# Patient Record
Sex: FEMALE | Race: WHITE | NOT HISPANIC OR LATINO | Employment: FULL TIME | URBAN - METROPOLITAN AREA
[De-identification: names, ages, dates, MRNs, and addresses within clinical notes are randomized per-mention and may not be internally consistent; named-entity substitution may affect disease eponyms.]

---

## 2019-08-26 ENCOUNTER — TELEPHONE (OUTPATIENT)
Dept: FAMILY MEDICINE | Facility: CLINIC | Age: 39
End: 2019-08-26

## 2019-08-26 ENCOUNTER — OFFICE VISIT (OUTPATIENT)
Dept: FAMILY MEDICINE | Facility: CLINIC | Age: 39
End: 2019-08-26
Payer: COMMERCIAL

## 2019-08-26 VITALS
TEMPERATURE: 98.4 F | DIASTOLIC BLOOD PRESSURE: 67 MMHG | HEIGHT: 67 IN | BODY MASS INDEX: 17.37 KG/M2 | SYSTOLIC BLOOD PRESSURE: 106 MMHG | OXYGEN SATURATION: 98 % | WEIGHT: 110.7 LBS | RESPIRATION RATE: 18 BRPM | HEART RATE: 80 BPM

## 2019-08-26 DIAGNOSIS — R61 NIGHT SWEATS: ICD-10-CM

## 2019-08-26 DIAGNOSIS — R35.0 URINARY FREQUENCY: ICD-10-CM

## 2019-08-26 DIAGNOSIS — R25.1 SHAKY: ICD-10-CM

## 2019-08-26 DIAGNOSIS — M54.50 CHRONIC BILATERAL LOW BACK PAIN WITHOUT SCIATICA: ICD-10-CM

## 2019-08-26 DIAGNOSIS — R82.71 BACTERIA IN URINE: ICD-10-CM

## 2019-08-26 DIAGNOSIS — N83.209 CYST OF OVARY, UNSPECIFIED LATERALITY: ICD-10-CM

## 2019-08-26 DIAGNOSIS — B96.89 BACTERIAL VAGINOSIS: ICD-10-CM

## 2019-08-26 DIAGNOSIS — R11.0 NAUSEA: ICD-10-CM

## 2019-08-26 DIAGNOSIS — M54.2 NECK PAIN ON LEFT SIDE: Primary | ICD-10-CM

## 2019-08-26 DIAGNOSIS — N89.8 VAGINAL DISCHARGE: ICD-10-CM

## 2019-08-26 DIAGNOSIS — R53.83 OTHER FATIGUE: ICD-10-CM

## 2019-08-26 DIAGNOSIS — N34.1 NONGONOCOCCAL URETHRITIS DUE TO UREAPLASMA UREALYTICUM: ICD-10-CM

## 2019-08-26 DIAGNOSIS — Z71.6 ENCOUNTER FOR TOBACCO USE CESSATION COUNSELING: ICD-10-CM

## 2019-08-26 DIAGNOSIS — B37.0 ORAL THRUSH: ICD-10-CM

## 2019-08-26 DIAGNOSIS — N76.0 BACTERIAL VAGINOSIS: ICD-10-CM

## 2019-08-26 DIAGNOSIS — G89.29 CHRONIC BILATERAL LOW BACK PAIN WITHOUT SCIATICA: ICD-10-CM

## 2019-08-26 DIAGNOSIS — A49.3 NONGONOCOCCAL URETHRITIS DUE TO UREAPLASMA UREALYTICUM: ICD-10-CM

## 2019-08-26 LAB
ALBUMIN SERPL-MCNC: 4.4 G/DL (ref 3.4–5)
ALBUMIN UR-MCNC: NEGATIVE MG/DL
ALP SERPL-CCNC: 41 U/L (ref 40–150)
ALT SERPL W P-5'-P-CCNC: 22 U/L (ref 0–50)
ANION GAP SERPL CALCULATED.3IONS-SCNC: 8 MMOL/L (ref 3–14)
APPEARANCE UR: CLEAR
AST SERPL W P-5'-P-CCNC: 12 U/L (ref 0–45)
BACTERIA #/AREA URNS HPF: ABNORMAL /HPF
BILIRUB SERPL-MCNC: 0.5 MG/DL (ref 0.2–1.3)
BILIRUB UR QL STRIP: NEGATIVE
BUN SERPL-MCNC: 12 MG/DL (ref 7–30)
CALCIUM SERPL-MCNC: 8.9 MG/DL (ref 8.5–10.1)
CHLORIDE SERPL-SCNC: 106 MMOL/L (ref 94–109)
CO2 SERPL-SCNC: 27 MMOL/L (ref 20–32)
COLOR UR AUTO: YELLOW
CORTIS SERPL-MCNC: 6.8 UG/DL (ref 4–22)
CREAT SERPL-MCNC: 0.65 MG/DL (ref 0.52–1.04)
CRP SERPL-MCNC: <2.9 MG/L (ref 0–8)
ERYTHROCYTE [SEDIMENTATION RATE] IN BLOOD BY WESTERGREN METHOD: 5 MM/H (ref 0–20)
GFR SERPL CREATININE-BSD FRML MDRD: >90 ML/MIN/{1.73_M2}
GLUCOSE SERPL-MCNC: 92 MG/DL (ref 70–99)
GLUCOSE UR STRIP-MCNC: NEGATIVE MG/DL
HBA1C MFR BLD: 4.8 % (ref 0–5.6)
HGB UR QL STRIP: ABNORMAL
KETONES UR STRIP-MCNC: ABNORMAL MG/DL
LEUKOCYTE ESTERASE UR QL STRIP: NEGATIVE
MUCOUS THREADS #/AREA URNS LPF: PRESENT /LPF
NITRATE UR QL: NEGATIVE
NON-SQ EPI CELLS #/AREA URNS LPF: ABNORMAL /LPF
PH UR STRIP: 6 PH (ref 5–7)
POTASSIUM SERPL-SCNC: 3.7 MMOL/L (ref 3.4–5.3)
PROT SERPL-MCNC: 7.6 G/DL (ref 6.8–8.8)
RBC #/AREA URNS AUTO: ABNORMAL /HPF
SODIUM SERPL-SCNC: 141 MMOL/L (ref 133–144)
SOURCE: ABNORMAL
SP GR UR STRIP: 1.02 (ref 1–1.03)
TSH SERPL DL<=0.005 MIU/L-ACNC: 2 MU/L (ref 0.4–4)
UROBILINOGEN UR STRIP-ACNC: 0.2 EU/DL (ref 0.2–1)
WBC #/AREA URNS AUTO: ABNORMAL /HPF

## 2019-08-26 PROCEDURE — 86618 LYME DISEASE ANTIBODY: CPT | Performed by: NURSE PRACTITIONER

## 2019-08-26 PROCEDURE — 86376 MICROSOMAL ANTIBODY EACH: CPT | Performed by: NURSE PRACTITIONER

## 2019-08-26 PROCEDURE — 87798 DETECT AGENT NOS DNA AMP: CPT | Mod: 90 | Performed by: NURSE PRACTITIONER

## 2019-08-26 PROCEDURE — 85652 RBC SED RATE AUTOMATED: CPT | Performed by: NURSE PRACTITIONER

## 2019-08-26 PROCEDURE — 84443 ASSAY THYROID STIM HORMONE: CPT | Performed by: NURSE PRACTITIONER

## 2019-08-26 PROCEDURE — 87086 URINE CULTURE/COLONY COUNT: CPT | Performed by: NURSE PRACTITIONER

## 2019-08-26 PROCEDURE — 87591 N.GONORRHOEAE DNA AMP PROB: CPT | Performed by: NURSE PRACTITIONER

## 2019-08-26 PROCEDURE — 80053 COMPREHEN METABOLIC PANEL: CPT | Performed by: NURSE PRACTITIONER

## 2019-08-26 PROCEDURE — 99204 OFFICE O/P NEW MOD 45 MIN: CPT | Performed by: NURSE PRACTITIONER

## 2019-08-26 PROCEDURE — 86039 ANTINUCLEAR ANTIBODIES (ANA): CPT | Performed by: NURSE PRACTITIONER

## 2019-08-26 PROCEDURE — 87491 CHLMYD TRACH DNA AMP PROBE: CPT | Performed by: NURSE PRACTITIONER

## 2019-08-26 PROCEDURE — 81001 URINALYSIS AUTO W/SCOPE: CPT | Performed by: NURSE PRACTITIONER

## 2019-08-26 PROCEDURE — 99000 SPECIMEN HANDLING OFFICE-LAB: CPT | Performed by: NURSE PRACTITIONER

## 2019-08-26 PROCEDURE — 87109 MYCOPLASMA: CPT | Performed by: NURSE PRACTITIONER

## 2019-08-26 PROCEDURE — 87210 SMEAR WET MOUNT SALINE/INK: CPT | Performed by: NURSE PRACTITIONER

## 2019-08-26 PROCEDURE — 86140 C-REACTIVE PROTEIN: CPT | Performed by: NURSE PRACTITIONER

## 2019-08-26 PROCEDURE — 85027 COMPLETE CBC AUTOMATED: CPT | Performed by: NURSE PRACTITIONER

## 2019-08-26 PROCEDURE — 86038 ANTINUCLEAR ANTIBODIES: CPT | Performed by: NURSE PRACTITIONER

## 2019-08-26 PROCEDURE — 83036 HEMOGLOBIN GLYCOSYLATED A1C: CPT | Performed by: NURSE PRACTITIONER

## 2019-08-26 PROCEDURE — 82533 TOTAL CORTISOL: CPT | Performed by: NURSE PRACTITIONER

## 2019-08-26 PROCEDURE — 36415 COLL VENOUS BLD VENIPUNCTURE: CPT | Performed by: NURSE PRACTITIONER

## 2019-08-26 RX ORDER — METRONIDAZOLE 500 MG/1
500 TABLET ORAL 2 TIMES DAILY
Qty: 14 TABLET | Refills: 0 | Status: SHIPPED | OUTPATIENT
Start: 2019-08-26 | End: 2019-09-10

## 2019-08-26 RX ORDER — FLUCONAZOLE 100 MG/1
100 TABLET ORAL DAILY
Qty: 15 TABLET | Refills: 0 | Status: SHIPPED | OUTPATIENT
Start: 2019-08-26 | End: 2019-09-10

## 2019-08-26 RX ORDER — NICOTINE 21 MG/24HR
1 PATCH, TRANSDERMAL 24 HOURS TRANSDERMAL EVERY 24 HOURS
Qty: 30 PATCH | Refills: 0 | Status: SHIPPED | OUTPATIENT
Start: 2019-08-26 | End: 2019-09-10

## 2019-08-26 RX ORDER — NYSTATIN 100000/ML
500000 SUSPENSION, ORAL (FINAL DOSE FORM) ORAL
COMMUNITY
Start: 2019-07-08 | End: 2019-09-10

## 2019-08-26 ASSESSMENT — PAIN SCALES - GENERAL: PAINLEVEL: MODERATE PAIN (5)

## 2019-08-26 ASSESSMENT — MIFFLIN-ST. JEOR: SCORE: 1206.82

## 2019-08-26 NOTE — PROGRESS NOTES
Subjective     Patience Gay is a 38 year old female who presents to clinic today for the following health issues:    HPI   ED/UC Followup:    Facility:  [facility]  Date of visit: 2-3 weeks ago  Reason for visit: Thrush  Current Status: Still having symptoms and always tired     Having some Vaginal Irritation-Possible STD check    Has multiple concerns: all have been on-going for months, other than oral thrush.  Left side of her neck is sore, tender. Throbs. On-going for months. Has not had US done yet. Is able to swallow, sometimes feels like there is a 'lump' in there. No rash.     Gets shaky, nauseated all the time. Also getting swollen feet. feel and bottom of legs go numb.   Always tired  Left lower back feels sore but denies injury  Has seen urology in the past, hasn't gotten kidney function lately  Sweating a lot at night  Vaginal concerns: unsure if yeast, not painful, but always frequency.     Denies tick bite.     Oral concerns: symptoms improved with fluconazole but returned. She has continued to use the oral nystatin to keep it at bay.    Had elevated MARLY-was given referral for Rheumatology but has not seen them yet.    She has been googling things, she wonders if she has diabetes.   Doesn't know mothers side for PMH.  Hx of anxiety/depression in family. Also for her. Is not currently on anything. Has done therapy before, is not right now. Does not seem interested in starting anything for anxiety.     Has had D+C, ablation, tubes cauterized,     Smokes a pack every few days.   Alcohol: more social.   Denies cocaine/marijuana.   Tried CBD oil, didn't help her  Also trouble sleeping at night. Has not tried melatonin.     Was told to get US for cyst on ovaries and also had inconclusive for pap so needs that repeated.           There is no problem list on file for this patient.    History reviewed. No pertinent surgical history.    Social History     Tobacco Use     Smoking status: Current  "Some Day Smoker     Smokeless tobacco: Never Used   Substance Use Topics     Alcohol use: Yes     Family History   Problem Relation Age of Onset     Colon Cancer Father      Thyroid Disease Paternal Aunt      Thyroid Disease Paternal Aunt      Breast Cancer Cousin         mothers side     Colon Cancer Paternal Grandfather      Diabetes No family hx of          Current Outpatient Medications   Medication Sig Dispense Refill     fluconazole (DIFLUCAN) 100 MG tablet Take 1 tablet (100 mg) by mouth daily for 15 days 15 tablet 0     metroNIDAZOLE (FLAGYL) 500 MG tablet Take 1 tablet (500 mg) by mouth 2 times daily for 7 days 14 tablet 0     nicotine (NICODERM CQ) 14 MG/24HR 24 hr patch Place 1 patch onto the skin every 24 hours 30 patch 0     nicotine (NICORETTE) 2 MG gum Place 1 each (2 mg) inside cheek 4 times daily as needed for smoking cessation 40 each 1     nystatin (MYCOSTATIN) 384249 UNIT/ML suspension 500,000 Units       Allergies   Allergen Reactions     No Clinical Screening - See Comments Itching     With anesthesia       Reviewed and updated as needed this visit by Provider  Tobacco  Allergies  Meds  Problems  Med Hx  Surg Hx  Fam Hx         Review of Systems   ROS COMP: Constitutional, HEENT, cardiovascular, pulmonary, gi and gu, MS as above, psych as above, systems are negative, except as otherwise noted.      Objective    /67 (BP Location: Right arm, Patient Position: Sitting, Cuff Size: Adult Regular)   Pulse 80   Temp 98.4  F (36.9  C) (Oral)   Resp 18   Ht 1.689 m (5' 6.5\")   Wt 50.2 kg (110 lb 11.2 oz)   SpO2 98%   BMI 17.60 kg/m    Body mass index is 17.6 kg/m .  Physical Exam   GENERAL: healthy, alert and no acute distress  EYES: Eyes grossly normal to inspection, PERRL and conjunctivae and sclerae normal  HENT: ear canals and TM's normal, nose and mouth without ulcers or lesions  NECK: no adenopathy, no asymmetry, masses, or scars and thyroid normal to palpation, not much " discomfort with palpation to neck  RESP: lungs clear to auscultation - no rales, rhonchi or wheezes  CV: regular rate and rhythm, normal S1 S2, no S3 or S4, no murmur, click or rub, no peripheral edema  ABDOMEN: soft, nontender, no hepatosplenomegaly, no masses and bowel sounds normal  MS: no gross musculoskeletal defects noted, no low back pain with palpation  SKIN: no suspicious lesions or rashes  NEURO: Normal strength and tone, mentation intact and speech normal  PSYCH: mentation appears normal, affect normal to tearful. Poor eye contact    Diagnostic Test Results:  Labs reviewed in Epic  Results for orders placed or performed in visit on 08/26/19 (from the past 24 hour(s))   Comprehensive metabolic panel (BMP + Alb, Alk Phos, ALT, AST, Total. Bili, TP)   Result Value Ref Range    Sodium 141 133 - 144 mmol/L    Potassium 3.7 3.4 - 5.3 mmol/L    Chloride 106 94 - 109 mmol/L    Carbon Dioxide 27 20 - 32 mmol/L    Anion Gap 8 3 - 14 mmol/L    Glucose 92 70 - 99 mg/dL    Urea Nitrogen 12 7 - 30 mg/dL    Creatinine 0.65 0.52 - 1.04 mg/dL    GFR Estimate >90 >60 mL/min/[1.73_m2]    GFR Estimate If Black >90 >60 mL/min/[1.73_m2]    Calcium 8.9 8.5 - 10.1 mg/dL    Bilirubin Total 0.5 0.2 - 1.3 mg/dL    Albumin 4.4 3.4 - 5.0 g/dL    Protein Total 7.6 6.8 - 8.8 g/dL    Alkaline Phosphatase 41 40 - 150 U/L    ALT 22 0 - 50 U/L    AST 12 0 - 45 U/L   Lyme Disease Joanne with reflex to WB Serum   Result Value Ref Range    Lyme Disease Antibodies Serum 0.20 0.00 - 0.89   Anti Nuclear Joanne IgG by IFA with Reflex   Result Value Ref Range    MARLY interpretation Borderline Positive (A) NEG^Negative    MARLY pattern 1 DENSE FINE SPECKLED     MARLY titer 1 1:80    Thyroid peroxidase antibody   Result Value Ref Range    Thyroid Peroxidase Antibody <10 <35 IU/mL   TSH with free T4 reflex   Result Value Ref Range    TSH 2.00 0.40 - 4.00 mU/L   Hemoglobin A1c   Result Value Ref Range    Hemoglobin A1C 4.8 0 - 5.6 %   ESR: Erythrocyte  sedimentation rate   Result Value Ref Range    Sed Rate 5 0 - 20 mm/h   CRP, inflammation   Result Value Ref Range    CRP Inflammation <2.9 0.0 - 8.0 mg/L   Cortisol   Result Value Ref Range    Cortisol Serum 6.8 4 - 22 ug/dL   *UA reflex to Microscopic and Culture ([facility] (except [place] and [place])   Result Value Ref Range    Color Urine Yellow     Appearance Urine Clear     Glucose Urine Negative NEG^Negative mg/dL    Bilirubin Urine Negative NEG^Negative    Ketones Urine Trace (A) NEG^Negative mg/dL    Specific Gravity Urine 1.025 1.003 - 1.035    Blood Urine Trace (A) NEG^Negative    pH Urine 6.0 5.0 - 7.0 pH    Protein Albumin Urine Negative NEG^Negative mg/dL    Urobilinogen Urine 0.2 0.2 - 1.0 EU/dL    Nitrite Urine Negative NEG^Negative    Leukocyte Esterase Urine Negative NEG^Negative    Source Urine    Chlamydia trachomatis PCR   Result Value Ref Range    Specimen Description Urine     Chlamydia Trachomatis PCR Negative NEG^Negative   Neisseria gonorrhoeae PCR   Result Value Ref Range    Specimen Descrip Urine     N Gonorrhea PCR Negative NEG^Negative   Urine Microscopic   Result Value Ref Range    WBC Urine 0 - 5 OTO5^0 - 5 /HPF    RBC Urine O - 2 OTO2^O - 2 /HPF    Squamous Epithelial /LPF Urine Moderate (A) FEW^Few /LPF    Bacteria Urine Moderate (A) NEG^Negative /HPF    Mucous Urine Present (A) NEG^Negative /LPF   Mycoplasma large colony culture   Result Value Ref Range    Specimen Description Unspecified Urine     Culture Micro No Trichomonas seen     Culture Micro Clue cells seen  Moderate   (A)     Culture Micro No yeast seen     Culture Micro WBC'S seen  Moderate              Assessment & Plan     1. Neck pain on left side  Can swallow okay, check US  - US Head Neck Soft Tissue; Future    2. Nausea/3. Other fatigue/4. Shaky/6. Night sweats  Has multiple concerns. Is okay with large lab workup. May need to follow up with Rheumatology, was given referral in the past, had not  "followed up. MARLY borderline positive.    - CBC with platelets  - Comprehensive metabolic panel (BMP + Alb, Alk Phos, ALT, AST, Total. Bili, TP)  - Anti Nuclear Joanne IgG by IFA with Reflex  - Thyroid peroxidase antibody  - TSH with free T4 reflex  - Cortisol  - ESR: Erythrocyte sedimentation rate  - CRP, inflammation  - Ehrlichia Anaplasma Sp by PCR  - Lyme Disease Joanne with reflex to WB Serum  - Hemoglobin A1c      5. Chronic bilateral low back pain without sciatica  Denies trauma. Does not exercise, states its feels \"deep in there\" and wonders if related to her kidneys. Renal function is normal. Likely more muscle related. Could consider low back x-ray next time if not improved.       7. Urinary frequency  Screening for infection  - *UA reflex to Microscopic and Culture ([facility] (except [place] and [place])  - Mycoplasma large colony culture    8. Cyst of ovary, unspecified laterality  Was told in the past few months she has cyst on her ovary, needs pelvic US, will order  - US Pelvic Complete w Transvaginal; Future    9. Vaginal discharge  screening  - Chlamydia trachomatis PCR  - Neisseria gonorrhoeae PCR  - Wet prep  - Urine Microscopic    10. Oral thrush  Feels this never resolved from last appointment. Will trial longer diflucan  - nystatin (MYCOSTATIN) 632901 UNIT/ML suspension; 500,000 Units  - fluconazole (DIFLUCAN) 100 MG tablet; Take 1 tablet (100 mg) by mouth daily for 15 days  Dispense: 15 tablet; Refill: 0    11. Encounter for tobacco use cessation counseling  Wondering about what can help her quit smoking. Trial patch and use gum prn  - nicotine (NICODERM CQ) 14 MG/24HR 24 hr patch; Place 1 patch onto the skin every 24 hours  Dispense: 30 patch; Refill: 0  - nicotine (NICORETTE) 2 MG gum; Place 1 each (2 mg) inside cheek 4 times daily as needed for smoking cessation  Dispense: 40 each; Refill: 1    12. Bacteria in urine  screening  - Urine Culture Aerobic Bacterial    13. Bacterial " vaginosis  Patient sent a message back stated she didn't want to do treatment unless she had to. She may trial Boric acid suppositories. See phone encounter.   - metroNIDAZOLE (FLAGYL) 500 MG tablet; Take 1 tablet (500 mg) by mouth 2 times daily for 7 days  Dispense: 14 tablet; Refill: 0       Tobacco Cessation:   reports that she has been smoking.  She has never used smokeless tobacco.  Tobacco Cessation Action Plan: Pharmacotherapies : Nicotine patch        Return in about 2 weeks (around 9/9/2019) for Routine Visit. and to further Discussed in clinic visit other concerns.    RUDY Adams, NP-C  [facility]

## 2019-08-26 NOTE — LETTER
September 3, 2019      Patience Gay  [id number] [place] [address]        Dear Patience,           All of your lab results were normal other than the MARLY which was borderline positive. Would recommend follow up with Rheumatology for the elevated MARLY, if you would like a referral please let me know.  From the urine/wet prep tests you were positive for Bacterial Vaginosis, treatment was sent to your pharmacy. I also advised staff to call you about the positive urine infection called Ureaplasma: take doxycycline 1 pill twice a day for 7 days. This was also sent to your pharmacy last week. I do not see that you have done the ultrasound of your neck yet, please get that done when you have time.     Please contact the clinic if you have additional questions.  Thank you.     Sincerely,     RUDY Adams, NP-C   [facility]     Results for orders placed or performed in visit on 08/26/19   CBC with platelets   Result Value Ref Range    WBC 8.2 4.0 - 11.0 10e9/L    RBC Count 4.68 3.8 - 5.2 10e12/L    Hemoglobin 14.9 11.7 - 15.7 g/dL    Hematocrit 44.7 35.0 - 47.0 %    MCV 96 78 - 100 fl    MCH 31.8 26.5 - 33.0 pg    MCHC 33.3 31.5 - 36.5 g/dL    RDW 12.1 10.0 - 15.0 %    Platelet Count 250 150 - 450 10e9/L   Comprehensive metabolic panel (BMP + Alb, Alk Phos, ALT, AST, Total. Bili, TP)   Result Value Ref Range    Sodium 141 133 - 144 mmol/L    Potassium 3.7 3.4 - 5.3 mmol/L    Chloride 106 94 - 109 mmol/L    Carbon Dioxide 27 20 - 32 mmol/L    Anion Gap 8 3 - 14 mmol/L    Glucose 92 70 - 99 mg/dL    Urea Nitrogen 12 7 - 30 mg/dL    Creatinine 0.65 0.52 - 1.04 mg/dL    GFR Estimate >90 >60 mL/min/[1.73_m2]    GFR Estimate If Black >90 >60 mL/min/[1.73_m2]    Calcium 8.9 8.5 - 10.1 mg/dL    Bilirubin Total 0.5 0.2 - 1.3 mg/dL    Albumin 4.4 3.4 - 5.0 g/dL    Protein Total 7.6 6.8 - 8.8 g/dL    Alkaline Phosphatase 41 40 - 150 U/L    ALT 22 0 - 50 U/L    AST 12 0 - 45 U/L   Lyme Disease Joanne with reflex  to WB Serum   Result Value Ref Range    Lyme Disease Antibodies Serum 0.20 0.00 - 0.89   Anti Nuclear Joanne IgG by IFA with Reflex   Result Value Ref Range    MARLY interpretation Borderline Positive (A) NEG^Negative    MARLY pattern 1 DENSE FINE SPECKLED     MARLY titer 1 1:80    Thyroid peroxidase antibody   Result Value Ref Range    Thyroid Peroxidase Antibody <10 <35 IU/mL   TSH with free T4 reflex   Result Value Ref Range    TSH 2.00 0.40 - 4.00 mU/L   Cortisol   Result Value Ref Range    Cortisol Serum 6.8 4 - 22 ug/dL   Hemoglobin A1c   Result Value Ref Range    Hemoglobin A1C 4.8 0 - 5.6 %   ESR: Erythrocyte sedimentation rate   Result Value Ref Range    Sed Rate 5 0 - 20 mm/h   CRP, inflammation   Result Value Ref Range    CRP Inflammation <2.9 0.0 - 8.0 mg/L   Ehrlichia Anaplasma Sp by PCR   Result Value Ref Range    Anaplasma phagocytophilum Not Detected     Ehrlichia chaffeensis Not Detected     Ehrlichia ewingii/canis Not Detected     Ehrlichia muris-like Not Detected    *UA reflex to Microscopic and Culture ([place] [facility] (except [place] and [place])   Result Value Ref Range    Color Urine Yellow     Appearance Urine Clear     Glucose Urine Negative NEG^Negative mg/dL    Bilirubin Urine Negative NEG^Negative    Ketones Urine Trace (A) NEG^Negative mg/dL    Specific Gravity Urine 1.025 1.003 - 1.035    Blood Urine Trace (A) NEG^Negative    pH Urine 6.0 5.0 - 7.0 pH    Protein Albumin Urine Negative NEG^Negative mg/dL    Urobilinogen Urine 0.2 0.2 - 1.0 EU/dL    Nitrite Urine Negative NEG^Negative    Leukocyte Esterase Urine Negative NEG^Negative    Source Urine    Urine Microscopic   Result Value Ref Range    WBC Urine 0 - 5 OTO5^0 - 5 /HPF    RBC Urine O - 2 OTO2^O - 2 /HPF    Squamous Epithelial /LPF Urine Moderate (A) FEW^Few /LPF    Bacteria Urine Moderate (A) NEG^Negative /HPF    Mucous Urine Present (A) NEG^Negative /LPF   Chlamydia trachomatis PCR   Result Value Ref Range    Specimen  Description Urine     Chlamydia Trachomatis PCR Negative NEG^Negative   Neisseria gonorrhoeae PCR   Result Value Ref Range    Specimen Descrip Urine     N Gonorrhea PCR Negative NEG^Negative   Wet prep   Result Value Ref Range    Specimen Description Vagina     Wet Prep No Trichomonas seen     Wet Prep Moderate  Clue cells seen   (A)     Wet Prep No yeast seen     Wet Prep Moderate  WBC'S seen      Mycoplasma large colony culture   Result Value Ref Range    Specimen Description Unspecified Urine     Culture Micro Ureaplasma species isolated (A)     Culture Micro No large colony Mycoplasma species isolated    Urine Culture Aerobic Bacterial   Result Value Ref Range    Specimen Description Urine     Culture Micro       <10,000 colonies/mL  mixed urogenital maggie  Susceptibility testing not routinely done

## 2019-08-26 NOTE — TELEPHONE ENCOUNTER
Reviewed results and recommendations with patient.       She does not want to take the flagyl unless she  absolutely has to. So she is wondering the following questions.    1. Can bacterial vaginosis go away on its own or does it have to be treated with medication?    2. Would boric acid vaginal suppositories be effective for BV in her case?            Notes recorded by Yary Dorado NP on 8/26/2019 at 3:54 PM CDT  Please call patient: she has bacterial vaginosis, I will send treatment: flagyl 1 pill twice a day for 7 days. No alcohol while taking this medication and no intercourse during treatment. There is no yeast in the vagina, diabetes indicator is negative. Other labs still pending. Encourage her to sign up for mychart.  RUDY Adams, NP-C  [facility]

## 2019-08-27 LAB
ANA PAT SER IF-IMP: ABNORMAL
ANA SER QL IF: ABNORMAL
ANA TITR SER IF: ABNORMAL {TITER}
B BURGDOR IGG+IGM SER QL: 0.2 (ref 0–0.89)
BACTERIA SPEC CULT: NORMAL
C TRACH DNA SPEC QL NAA+PROBE: NEGATIVE
ERYTHROCYTE [DISTWIDTH] IN BLOOD BY AUTOMATED COUNT: 12.1 % (ref 10–15)
HCT VFR BLD AUTO: 44.7 % (ref 35–47)
HGB BLD-MCNC: 14.9 G/DL (ref 11.7–15.7)
MCH RBC QN AUTO: 31.8 PG (ref 26.5–33)
MCHC RBC AUTO-ENTMCNC: 33.3 G/DL (ref 31.5–36.5)
MCV RBC AUTO: 96 FL (ref 78–100)
N GONORRHOEA DNA SPEC QL NAA+PROBE: NEGATIVE
PLATELET # BLD AUTO: 250 10E9/L (ref 150–450)
RBC # BLD AUTO: 4.68 10E12/L (ref 3.8–5.2)
SPECIMEN SOURCE: ABNORMAL
SPECIMEN SOURCE: NORMAL
THYROPEROXIDASE AB SERPL-ACNC: <10 IU/ML
WBC # BLD AUTO: 8.2 10E9/L (ref 4–11)
WET PREP SPEC: ABNORMAL

## 2019-08-27 NOTE — TELEPHONE ENCOUNTER
Please call patient:   1. Bacterial vaginosis can sometimes clear on it's own. We can always do a watch and wait. It is an overgrowth of bacteria that typically responds to treatment. Using probiotics can also sometimes help. If she continues to have symptoms then would recommend starting treatment-so would recommend picking up medication now in case things do not improve in a few weeks. Remind her any medications requested after 7 days from an appointment requires another appointment first, either phone, e-visit or in person.     2. Boric Acid may help improve the Ph of the vaginal canal, which theoretically can help improve the healthy bacteria and thus improve the BV. It typically improves yeast more than bacterial vaginosis.  She can trial it if she likes. 1 600 mg suppository nightly for 7 nights but she will have to get that OTC, it will not be sent as prescription.    Send back to NP if she has further questions.   RUDY Adams, NP-C  [facility]

## 2019-08-30 ENCOUNTER — TELEPHONE (OUTPATIENT)
Dept: FAMILY MEDICINE | Facility: CLINIC | Age: 39
End: 2019-08-30

## 2019-08-30 LAB
A PHAGOCYTOPH DNA BLD QL NAA+PROBE: NOT DETECTED
E CHAFFEENSIS DNA BLD QL NAA+PROBE: NOT DETECTED
E EWINGII DNA SPEC QL NAA+PROBE: NOT DETECTED
EHRLICHIA DNA SPEC QL NAA+PROBE: NOT DETECTED

## 2019-08-30 RX ORDER — DOXYCYCLINE 100 MG/1
100 CAPSULE ORAL 2 TIMES DAILY
Qty: 14 CAPSULE | Refills: 0 | Status: SHIPPED | OUTPATIENT
Start: 2019-08-30 | End: 2019-09-10

## 2019-08-30 NOTE — TELEPHONE ENCOUNTER
Writer spoke with patient today regarding UCx results and recommended treatment.    Patient asking for all blood test results. Asking for interpretation on these too.  Review does not show any comments from provider regarding blood testing done on 8/26/19. Patient would like call with these results, once given.    Routing to provider to review and advise.    Louise Gonzales RN

## 2019-09-02 LAB
BACTERIA SPEC CULT: ABNORMAL
BACTERIA SPEC CULT: ABNORMAL
SPECIMEN SOURCE: ABNORMAL

## 2019-09-03 NOTE — TELEPHONE ENCOUNTER
We called patient last week with abnormal's. Here is the lab letter from today:     All of your lab results were normal other than the MARLY which was borderline positive. Would recommend follow up with Rheumatology for the elevated MARLY, if you would like a referral please let me know.  From the urine/wet prep tests you were positive for Bacterial Vaginosis, treatment was sent to your pharmacy. You can chose to use this treatment or not like discussed last week.  I also advised staff to call you about the positive urine infection called Ureaplasmjesica: take doxycycline 1 pill twice a day for 7 days. This was also sent to your pharmacy last week. I do not see that you have done the ultrasound of your neck yet, please get that done when you have time.     Please sent back to NP if further questions.

## 2019-09-03 NOTE — TELEPHONE ENCOUNTER
Patient contacted and informed of the below per provider documentation. Patient verbalizes understanding.     Kelly Bah RN

## 2019-09-05 ENCOUNTER — NURSE TRIAGE (OUTPATIENT)
Dept: NURSING | Facility: CLINIC | Age: 39
End: 2019-09-05

## 2019-09-05 ENCOUNTER — TELEPHONE (OUTPATIENT)
Dept: FAMILY MEDICINE | Facility: CLINIC | Age: 39
End: 2019-09-05

## 2019-09-05 ASSESSMENT — ENCOUNTER SYMPTOMS
HEMATOCHEZIA: 0
CONSTIPATION: 0
ABDOMINAL PAIN: 0
COUGH: 0
EYE PAIN: 0
DIARRHEA: 0
NAUSEA: 0
PARESTHESIAS: 0
WEAKNESS: 0
FREQUENCY: 1
MYALGIAS: 0
NERVOUS/ANXIOUS: 0
DIZZINESS: 0
JOINT SWELLING: 0
BREAST MASS: 0
CHILLS: 0
ARTHRALGIAS: 0
SORE THROAT: 0
HEARTBURN: 0
PALPITATIONS: 0
SHORTNESS OF BREATH: 0
HEADACHES: 0
HEMATURIA: 0
DYSURIA: 0

## 2019-09-05 NOTE — TELEPHONE ENCOUNTER
Please see triage below from [facility]. Patient refusing to be seen in clinic. Patient last seen in clinic on 8/26/19.    Please review and advise.    Rachael Alcantar RN

## 2019-09-05 NOTE — TELEPHONE ENCOUNTER
Patient contacted. She requests Yary Dorado to please call her. Patient states it gets too complicating trying to pass messages between staff.     Routing to provider to review and advise.   Kelly Bah RN

## 2019-09-05 NOTE — TELEPHONE ENCOUNTER
"Clinic Action Needed:Yes, Please call patient at   Reason for Call:Patient calling requesting a message to Yary Dorado NP. Patient stating \"I am not coming in to be seen.\" Patient is requesting lab only appointment.  Stating she started antibiotics for UTI and bacterial vaginosis 1 week ago 8/27/19. Reporting no improvement in symptoms. Ongoing dysuria, frequency. Afebrile. Patient reporting feeling the same as when she went in on 8/26/19.     Patience Allen RN  [facility]      Reason for Disposition    [1] Taking antibiotic > 72 hours (3 days) for UTI AND [2] painful urination or frequency not improved    Additional Information    Negative: Shock suspected (e.g., cold/pale/clammy skin, too weak to stand, low BP, rapid pulse)    Negative: Sounds like a life-threatening emergency to the triager    Negative: Urinary tract infection suspected, but not taking antibiotics    Negative: [1] Unable to urinate (or only a few drops) > 4 hours AND     [2] bladder feels very full (e.g., palpable bladder or strong urge to urinate)    Negative: Passing pure blood or large blood clots (i.e., size > a dime)  (Exceptions: flecks, small strands, or pinkish-red color)    Negative: Patient sounds very sick or weak to the triager    Negative: [1] SEVERE pain (e.g., excruciating) AND [2] no improvement 2 hours after pain medications    Negative: [1] Fever > 100.0 F (37.8 C) AND [2] new onset since starting antibiotics    Negative: [1] Side (flank) or lower back pain AND [2] new onset since starting antibiotics    Negative: [1] Taking antibiotic > 24 hours for UTI AND [2] flank or lower back pain worsening    Negative: [1] Vomiting 2 or more times AND [2] interferes with taking oral antibiotic    Negative: [1] Taking antibiotic > 24 hours for UTI (urinary tract or bladder infection) AND [2] fever persists    Protocols used: URINARY TRACT INFECTION ON ANTIBIOTIC FOLLOW-UP CALL - FEMALE-A-      "

## 2019-09-05 NOTE — TELEPHONE ENCOUNTER
"Clinic Action Needed:Yes, Please call patient at   Reason for Call:Patient calling requesting a message to Yary Dorado NP. Patient stating \"I am not coming in to be seen.\" Patient is requesting lab only appointment.  Stating she started antibiotics for UTI and bacterial vaginosis 1 week ago 8/27/19. See lab letter results from 9/3/19.  Reporting no improvement in symptoms. Ongoing dysuria, frequency. Afebrile. Patient reporting feeling the same as when she went in on 8/26/19.     Patience Allen, RN  [facility]      Reason for Disposition    [1] Taking antibiotic > 72 hours (3 days) for UTI AND [2] painful urination or frequency not improved    Additional Information    Negative: Shock suspected (e.g., cold/pale/clammy skin, too weak to stand, low BP, rapid pulse)    Negative: Sounds like a life-threatening emergency to the triager    Negative: Urinary tract infection suspected, but not taking antibiotics    Negative: [1] Unable to urinate (or only a few drops) > 4 hours AND     [2] bladder feels very full (e.g., palpable bladder or strong urge to urinate)    Negative: Passing pure blood or large blood clots (i.e., size > a dime)  (Exceptions: flecks, small strands, or pinkish-red color)    Negative: Patient sounds very sick or weak to the triager    Negative: [1] SEVERE pain (e.g., excruciating) AND [2] no improvement 2 hours after pain medications    Negative: [1] Fever > 100.0 F (37.8 C) AND [2] new onset since starting antibiotics    Negative: [1] Side (flank) or lower back pain AND [2] new onset since starting antibiotics    Negative: [1] Taking antibiotic > 24 hours for UTI AND [2] flank or lower back pain worsening    Negative: [1] Vomiting 2 or more times AND [2] interferes with taking oral antibiotic    Negative: [1] Taking antibiotic > 24 hours for UTI (urinary tract or bladder infection) AND [2] fever persists    Protocols used: URINARY TRACT INFECTION ON ANTIBIOTIC FOLLOW-UP CALL - " FEMALE-A-AH

## 2019-09-05 NOTE — TELEPHONE ENCOUNTER
Patient had a lot going on at her last visit, and while she has a follow up with provider next week, advise she be seen tomorrow instead of next week to talk about her urinary symptoms and follow up on her other concerns. It is past the 7 day window from last appointment for further workup, will not send orders for UA at this time. She needs further workup and discussion by provider.  If she does not want to come in for an appointment tomorrow instead of next week, she can submit an e-visit where I can ask more questions.     RUDY Adams, NP-C  [facility]

## 2019-09-06 NOTE — TELEPHONE ENCOUNTER
"Still has burning sensation in the area but not during urination: it feels more \"up there\" vs near the urethra. She took the doxycyline treatment and also the flagyl. Says she normally gets the gel flagyl for BV but we gave her oral treatment this time. Denies fever/chills/flank pain. She is willing to monitor symptoms for now and follow up with NP next Tuesday to talk about symptoms. Did not give extended treatment at this time. Patient verbalized understanding.     RUDY Adams, NP-C  [facility]    "

## 2019-09-10 ENCOUNTER — OFFICE VISIT (OUTPATIENT)
Dept: FAMILY MEDICINE | Facility: CLINIC | Age: 39
End: 2019-09-10
Payer: COMMERCIAL

## 2019-09-10 ENCOUNTER — RESULT FOLLOW UP (OUTPATIENT)
Dept: FAMILY MEDICINE | Facility: CLINIC | Age: 39
End: 2019-09-10

## 2019-09-10 VITALS
HEIGHT: 67 IN | DIASTOLIC BLOOD PRESSURE: 58 MMHG | HEART RATE: 63 BPM | TEMPERATURE: 99.2 F | BODY MASS INDEX: 18.02 KG/M2 | OXYGEN SATURATION: 99 % | SYSTOLIC BLOOD PRESSURE: 84 MMHG | WEIGHT: 114.8 LBS

## 2019-09-10 DIAGNOSIS — N83.201 CYSTS OF BOTH OVARIES: ICD-10-CM

## 2019-09-10 DIAGNOSIS — R30.0 DYSURIA: ICD-10-CM

## 2019-09-10 DIAGNOSIS — N89.8 VAGINAL DISCHARGE: Primary | ICD-10-CM

## 2019-09-10 DIAGNOSIS — R31.29 OTHER MICROSCOPIC HEMATURIA: ICD-10-CM

## 2019-09-10 DIAGNOSIS — R87.810 CERVICAL HIGH RISK HPV (HUMAN PAPILLOMAVIRUS) TEST POSITIVE: ICD-10-CM

## 2019-09-10 DIAGNOSIS — R53.83 OTHER FATIGUE: ICD-10-CM

## 2019-09-10 DIAGNOSIS — Z12.4 SCREENING FOR MALIGNANT NEOPLASM OF CERVIX: ICD-10-CM

## 2019-09-10 DIAGNOSIS — Z12.4 SCREENING FOR CERVICAL CANCER: ICD-10-CM

## 2019-09-10 DIAGNOSIS — B37.0 ORAL THRUSH: ICD-10-CM

## 2019-09-10 DIAGNOSIS — N83.202 CYSTS OF BOTH OVARIES: ICD-10-CM

## 2019-09-10 LAB
ALBUMIN UR-MCNC: NEGATIVE MG/DL
APPEARANCE UR: CLEAR
BILIRUB UR QL STRIP: NEGATIVE
COLOR UR AUTO: YELLOW
GLUCOSE UR STRIP-MCNC: NEGATIVE MG/DL
HGB UR QL STRIP: ABNORMAL
KETONES UR STRIP-MCNC: NEGATIVE MG/DL
LEUKOCYTE ESTERASE UR QL STRIP: NEGATIVE
NITRATE UR QL: NEGATIVE
PH UR STRIP: 6 PH (ref 5–7)
RBC #/AREA URNS AUTO: NORMAL /HPF
SOURCE: ABNORMAL
SP GR UR STRIP: 1.01 (ref 1–1.03)
SPECIMEN SOURCE: ABNORMAL
UROBILINOGEN UR STRIP-ACNC: 0.2 EU/DL (ref 0.2–1)
WBC #/AREA URNS AUTO: NORMAL /HPF
WET PREP SPEC: ABNORMAL

## 2019-09-10 PROCEDURE — 87624 HPV HI-RISK TYP POOLED RSLT: CPT | Performed by: NURSE PRACTITIONER

## 2019-09-10 PROCEDURE — 87109 MYCOPLASMA: CPT | Performed by: NURSE PRACTITIONER

## 2019-09-10 PROCEDURE — 99214 OFFICE O/P EST MOD 30 MIN: CPT | Performed by: NURSE PRACTITIONER

## 2019-09-10 PROCEDURE — 81001 URINALYSIS AUTO W/SCOPE: CPT | Performed by: NURSE PRACTITIONER

## 2019-09-10 PROCEDURE — 87086 URINE CULTURE/COLONY COUNT: CPT | Performed by: NURSE PRACTITIONER

## 2019-09-10 PROCEDURE — 87210 SMEAR WET MOUNT SALINE/INK: CPT | Performed by: NURSE PRACTITIONER

## 2019-09-10 PROCEDURE — 87070 CULTURE OTHR SPECIMN AEROBIC: CPT | Mod: 59 | Performed by: NURSE PRACTITIONER

## 2019-09-10 PROCEDURE — G0145 SCR C/V CYTO,THINLAYER,RESCR: HCPCS | Performed by: NURSE PRACTITIONER

## 2019-09-10 ASSESSMENT — ENCOUNTER SYMPTOMS
WEAKNESS: 0
SORE THROAT: 0
BREAST MASS: 0
ARTHRALGIAS: 0
PARESTHESIAS: 0
JOINT SWELLING: 0
DIARRHEA: 0
COUGH: 0
HEADACHES: 0
SHORTNESS OF BREATH: 0
PALPITATIONS: 0
HEMATOCHEZIA: 0
DIZZINESS: 0
EYE PAIN: 0
MYALGIAS: 0
HEMATURIA: 0
CONSTIPATION: 0
NERVOUS/ANXIOUS: 0
NAUSEA: 0
CHILLS: 0
FREQUENCY: 1
HEARTBURN: 0
ABDOMINAL PAIN: 0
DYSURIA: 0

## 2019-09-10 ASSESSMENT — MIFFLIN-ST. JEOR: SCORE: 1225.42

## 2019-09-10 ASSESSMENT — PAIN SCALES - GENERAL: PAINLEVEL: NO PAIN (0)

## 2019-09-10 NOTE — PROGRESS NOTES
SUBJECTIVE:   CC: Patience Gay is an 38 year old woman who presents for preventive health visit.     Healthy Habits:     Getting at least 3 servings of Calcium per day:  Yes    Bi-annual eye exam:  Yes    Dental care twice a year:  Yes    Sleep apnea or symptoms of sleep apnea:  None    Diet:  Other    Frequency of exercise:  2-3 days/week    Duration of exercise:  30-45 minutes    Taking medications regularly:  Yes    Medication side effects:  Not applicable    PHQ-2 Total Score: 1    Between work and kids schedules, she has not done the neck or pelvic ultrasound yet. She needs the pap smear repeated today.  Does not want physical, she just had one in the past 6 months.     Vaginal urethral pain: feels it is more upper/inner vs at urethra. No dysuria, no hematura. She is willing to leave another urine sample today.    Vaginal discharge is the same. No odor    Oral thrush: no change with oral fluconazole. Has changed out toothbrush. Still feels film on tongue. Would be interested in bacterial swab, if that is negative then consider oral fluconazole. Also ENT referral given    Nauseated/shaky/fatigue symptoms: follow up with Rheumatology. Has tried probiotics, yogurt. Has not made an appointment yet, encouraged to do so.    No smoking for the past week or two.      Anxiety:  Answers for HPI/ROS submitted by the patient on 9/5/2019   Annual Exam:  Frequency of exercise:: 2-3 days/week  Getting at least 3 servings of Calcium per day:: Yes  Diet:: Other  Taking medications regularly:: Yes  Medication side effects:: Not applicable  Bi-annual eye exam:: Yes  Dental care twice a year:: Yes  Sleep apnea or symptoms of sleep apnea:: None  abdominal pain: No  Blood in stool: No  Blood in urine: No  chest pain: No  chills: No  congestion: No  constipation: No  cough: No  diarrhea: No  dizziness: No  ear pain: No  eye pain: No  nervous/anxious: No  frequency: Yes  genital sores: No  headaches: No  hearing loss:  No  heartburn: No  arthralgias: No  joint swelling: No  peripheral edema: No  mood changes: No  myalgias: No  nausea: No  dysuria: No  palpitations: No  Skin sensation changes: No  sore throat: No  urgency: Yes  rash: No  shortness of breath: No  visual disturbance: No  weakness: No  pelvic pain: No  vaginal bleeding: No  vaginal discharge: No  tenderness: No  breast mass: No  breast discharge: No  Duration of exercise:: 30-45 minutes      Today's PHQ-2 Score:   PHQ-2 ( 1999 [employer]) 9/5/2019   Q1: Little interest or pleasure in doing things 1   Q2: Feeling down, depressed or hopeless 0   PHQ-2 Score 1   Q1: Little interest or pleasure in doing things Several days   Q2: Feeling down, depressed or hopeless Not at all   PHQ-2 Score 1       Social History     Tobacco Use     Smoking status: Current Some Day Smoker     Smokeless tobacco: Never Used   Substance Use Topics     Alcohol use: Yes     If you drink alcohol do you typically have >3 drinks per day or >7 drinks per week? No    Reviewed orders with patient.  Reviewed health maintenance and updated orders accordingly - Yes  Lab work is in process  Labs reviewed in EPIC    Pertinent mammograms are reviewed under the imaging tab.  History of abnormal Pap smear: NO - age 30- 65 PAP every 3 years recommended     Reviewed and updated as needed this visit by clinical staff  Tobacco  Allergies  Meds  Problems  Med Hx  Surg Hx  Fam Hx  Soc Hx          Reviewed and updated as needed this visit by Provider  Tobacco  Allergies  Meds  Problems  Med Hx  Surg Hx  Fam Hx            Review of Systems   Constitutional: Negative for chills.   HENT: Negative for congestion, ear pain, hearing loss and sore throat.    Eyes: Negative for pain and visual disturbance.   Respiratory: Negative for cough and shortness of breath.    Cardiovascular: Negative for chest pain, palpitations and peripheral edema.   Gastrointestinal: Negative for abdominal pain, constipation, diarrhea,  "heartburn, hematochezia and nausea.   Breasts:  Negative for tenderness, breast mass and discharge.   Genitourinary: Positive for frequency and urgency. Negative for dysuria, genital sores, hematuria, pelvic pain, vaginal bleeding and vaginal discharge.   Musculoskeletal: Negative for arthralgias, joint swelling and myalgias.   Skin: Negative for rash.   Neurological: Negative for dizziness, weakness, headaches and paresthesias.   Psychiatric/Behavioral: Negative for mood changes. The patient is not nervous/anxious.           BP (!) 84/58 (BP Location: Right arm, Patient Position: Sitting, Cuff Size: Adult Regular)   Pulse 63   Temp 99.2  F (37.3  C) (Oral)   Ht 1.689 m (5' 6.5\")   Wt 52.1 kg (114 lb 12.8 oz)   SpO2 99%   BMI 18.25 kg/m    Physical Exam  GENERAL: healthy, alert and no distress  HENT: nose and mouth without ulcers or lesions. Tongue with slightly yellow film over it  NECK: no adenopathy, no asymmetry, masses, or scars and thyroid normal to palpation  RESP: lungs clear to auscultation - no rales, rhonchi or wheezes  CV: regular rate and rhythm, normal S1 S2, no S3 or S4, no murmur, click or rub   (female): normal female external genitalia, normal urethral meatus, vaginal mucosa pink, moist, well rugated, and normal cervix, no discharge noted.   MS: no gross musculoskeletal defects noted, no edema  SKIN: no suspicious lesions or rashes    Diagnostic Test Results:  Labs reviewed in Epic  No results found for this or any previous visit (from the past 24 hour(s)).    ASSESSMENT/PLAN:   1. Vaginal discharge  Pending results  - Wet prep    2. Screening for malignant neoplasm of cervix  Screening done  - HPV High Risk Types DNA Cervical    3. Other fatigue  Follow up with them, make appointment today  - RHEUMATOLOGY REFERRAL    4. Screening for cervical cancer  screening  - PAP imaged thin layer screen    5. Oral thrush  Check oral swab, if negative trial 5 more days of fluconazole, otherwise follow " "up with ENT  - OTOLARYNGOLOGY REFERRAL  - Fluid Culture Aerobic Bacterial    6. Dysuria  Screening for past infections to be cleared  - *UA reflex to Microscopic  - Mycoplasma large colony culture    COUNSELING:      Estimated body mass index is 18.25 kg/m  as calculated from the following:    Height as of this encounter: 1.689 m (5' 6.5\").    Weight as of this encounter: 52.1 kg (114 lb 12.8 oz).         reports that she has been smoking.  She has never used smokeless tobacco. she quite 2 weeks ago      RUDY Adams, NP-C  [facility]    "

## 2019-09-10 NOTE — LETTER
[facility]  [address] [place] [address]  [id number]  [phone]    September 10, 2019      Patience Deidra  [address] [place] [id number]      Dear Patience,    There is no bacterial vaginosis but a small amount of yeast noted. Since you do not have vaginal symptoms we can monitor for now. I do not think we need to do more oral medication for the yeast. Something OTC like monostat 3 would be fine too if you start feeling some vaginal itching.  One urine sample is negative for infection, the other one is still pending. Please call if you have questions or desire a referral to urology.     Thank you,         RUDY Adams, NP-C   [facility]

## 2019-09-11 LAB
BACTERIA SPEC CULT: NO GROWTH
SPECIMEN SOURCE: NORMAL

## 2019-09-12 LAB
BACTERIA SPEC CULT: NORMAL
COPATH REPORT: NORMAL
Lab: NORMAL
PAP: NORMAL
SPECIMEN SOURCE: NORMAL

## 2019-09-13 ENCOUNTER — MYC MEDICAL ADVICE (OUTPATIENT)
Dept: FAMILY MEDICINE | Facility: CLINIC | Age: 39
End: 2019-09-13

## 2019-09-16 PROBLEM — R87.810 CERVICAL HIGH RISK HPV (HUMAN PAPILLOMAVIRUS) TEST POSITIVE: Status: ACTIVE | Noted: 2019-09-10

## 2019-09-16 LAB
FINAL DIAGNOSIS: ABNORMAL
HPV HR 12 DNA CVX QL NAA+PROBE: POSITIVE
HPV16 DNA SPEC QL NAA+PROBE: NEGATIVE
HPV18 DNA SPEC QL NAA+PROBE: NEGATIVE
SPECIMEN DESCRIPTION: ABNORMAL
SPECIMEN SOURCE CVX/VAG CYTO: ABNORMAL

## 2019-09-17 NOTE — PROGRESS NOTES
2004 LSIL pap.  2005 LSIL pap.  6/10/05 COLP- KAMI 2 and KAMI 3.  10/28/05 CONE- KAMI 2 and KAMI 3  2006, 2008, 2010, 2013 NIL paps.   3/27/17 NIL pap, + HR HPV (not 16 or 18).  4/9/18 NIL pap, Neg HPV.  4/8/19 Unsatisfactory pap, Neg HPV.  ALL ABOVE RESULTS FOUND IN CARE EVERYWHERE  9/10/19 NIL pap, + HR HPV (not 16 or 18). Plan colposcopy  9/27/19 Left msg to call back (LN)  10/3/19 left msg  10/24/19 Anthony bx and ECC: neg. Plan: Cotest in 1 yr.

## 2019-09-18 LAB
BACTERIA SPEC CULT: NORMAL
SPECIMEN SOURCE: NORMAL

## 2019-09-24 ENCOUNTER — ANCILLARY PROCEDURE (OUTPATIENT)
Dept: ULTRASOUND IMAGING | Facility: CLINIC | Age: 39
End: 2019-09-24
Attending: NURSE PRACTITIONER
Payer: COMMERCIAL

## 2019-09-24 DIAGNOSIS — M54.2 NECK PAIN ON LEFT SIDE: ICD-10-CM

## 2019-09-24 DIAGNOSIS — N83.209 CYST OF OVARY, UNSPECIFIED LATERALITY: ICD-10-CM

## 2019-09-24 PROCEDURE — 76856 US EXAM PELVIC COMPLETE: CPT | Mod: 59 | Performed by: RADIOLOGY

## 2019-09-24 PROCEDURE — 76830 TRANSVAGINAL US NON-OB: CPT | Performed by: RADIOLOGY

## 2019-09-24 PROCEDURE — 76536 US EXAM OF HEAD AND NECK: CPT | Performed by: RADIOLOGY

## 2019-09-25 ENCOUNTER — TELEPHONE (OUTPATIENT)
Dept: FAMILY MEDICINE | Facility: CLINIC | Age: 39
End: 2019-09-25

## 2019-09-25 NOTE — TELEPHONE ENCOUNTER
Requesting clarification on patient's results. See copy/paste below:    Notes recorded by Yary Dorado NP on 9/25/2019 at 9:54 AM CDT  Recommend colp.  RUDY Adams, NP-C  [facility]      Is this referring to colposcopy?  If so, do not see order in system for one. Where will patient get this done?    Thanks,  Rachael Alcantar RN

## 2019-09-25 NOTE — TELEPHONE ENCOUNTER
Colposcopy is recommended due to abnormal Pap smear results and history of positive HPV.  Would recommend sending this to the pap nurses as they usually help get everything set up and notifying the patient.  RUDY Adams, NP-C  [facility]

## 2019-10-09 NOTE — TELEPHONE ENCOUNTER
Patient is questioning what are the aftercare for Colposcopy?  Can she go on with her day after the procedure (return to work, etc)  or does she have to rest?  She wants to know before she can schedule.     Patient can schedule appt with Dr. Perez or Dr. Greco as they do this procedure.   [id number], MEDICAL ASSISTANT

## 2019-10-09 NOTE — TELEPHONE ENCOUNTER
People usually like to rest the day of colposcopy but she could return to work if she wanted. May have some cramping/bleeding post procedure. She can return to work the next day.   RUDY Adams, NP-C  [facility]

## 2019-10-24 ENCOUNTER — OFFICE VISIT (OUTPATIENT)
Dept: FAMILY MEDICINE | Facility: CLINIC | Age: 39
End: 2019-10-24
Payer: COMMERCIAL

## 2019-10-24 VITALS
HEIGHT: 67 IN | WEIGHT: 116.9 LBS | BODY MASS INDEX: 18.35 KG/M2 | DIASTOLIC BLOOD PRESSURE: 65 MMHG | SYSTOLIC BLOOD PRESSURE: 109 MMHG | TEMPERATURE: 99.2 F | HEART RATE: 55 BPM | RESPIRATION RATE: 20 BRPM | OXYGEN SATURATION: 96 %

## 2019-10-24 DIAGNOSIS — R87.810 CERVICAL HIGH RISK HPV (HUMAN PAPILLOMAVIRUS) TEST POSITIVE: Primary | ICD-10-CM

## 2019-10-24 DIAGNOSIS — N91.2 AMENORRHEA: ICD-10-CM

## 2019-10-24 DIAGNOSIS — N83.201 CYSTS OF BOTH OVARIES: ICD-10-CM

## 2019-10-24 DIAGNOSIS — N83.202 CYSTS OF BOTH OVARIES: ICD-10-CM

## 2019-10-24 LAB
FSH SERPL-ACNC: 5.5 IU/L
HCG UR QL: NEGATIVE

## 2019-10-24 PROCEDURE — 36415 COLL VENOUS BLD VENIPUNCTURE: CPT | Performed by: FAMILY MEDICINE

## 2019-10-24 PROCEDURE — 81025 URINE PREGNANCY TEST: CPT | Performed by: FAMILY MEDICINE

## 2019-10-24 PROCEDURE — 83001 ASSAY OF GONADOTROPIN (FSH): CPT | Performed by: FAMILY MEDICINE

## 2019-10-24 PROCEDURE — 99213 OFFICE O/P EST LOW 20 MIN: CPT | Mod: 25 | Performed by: FAMILY MEDICINE

## 2019-10-24 PROCEDURE — 57454 BX/CURETT OF CERVIX W/SCOPE: CPT | Performed by: FAMILY MEDICINE

## 2019-10-24 PROCEDURE — 88305 TISSUE EXAM BY PATHOLOGIST: CPT | Performed by: FAMILY MEDICINE

## 2019-10-24 ASSESSMENT — MIFFLIN-ST. JEOR: SCORE: 1234.94

## 2019-10-24 ASSESSMENT — PAIN SCALES - GENERAL: PAINLEVEL: NO PAIN (0)

## 2019-10-24 NOTE — PATIENT INSTRUCTIONS
Nothing in vagina for 1 week.    Results will be sent when available of biopsies.  Lab testing today to determine hormone levels.  I will make recommendations for the GYN evaluation or Ultrasound based on these results.

## 2019-10-24 NOTE — PROGRESS NOTES
Subjective     Patience Gay is a 38 year old female who presents to clinic today for the following health issues:        HPI     - Presents for Colposcopy procedure    Patience Gay is a 38 year old female who presents for initial colposcopy, referred by Yary Dorado  . Pap smear 1 months ago showed: normal and HR HPV. The prior pap showed insufficient cells.     Past Medical History:   Diagnosis Date     Abnormal Pap smear of cervix 2004, 2005, 2019    See problem list     Cervical high risk HPV (human papillomavirus) test positive 09/10/2019    See problem list     Family History   Problem Relation Age of Onset     Colon Cancer Father      Thyroid Disease Paternal Aunt      Thyroid Disease Paternal Aunt      Breast Cancer Cousin         mothers side     Lung Cancer Paternal Grandfather      Diabetes No family hx of        Previous history of abnormal paps?: Yes - 2005 LGSIL  History of cryotherapy (freezing)?: : No  History of veneral diseases: : No  Do you desire testing for any of these diseases? : No  History of genital warts:  No  Visible warts now?:  No  Previously treated? If so, how?:  No     No LMP recorded (exact date). Patient has had an ablation.  Type of contraception: ablation  Age at first sexual intercourse: 15  Number of sexual partners (lifetime): uncertain  History   Smoking Status     Current Some Day Smoker     Packs/day: 0.00   Smokeless Tobacco     Never Used     History of sexual abuse:  No  Allergies as of 10/24/2019 - Reviewed 10/24/2019   Allergen Reaction Noted     No clinical screening - see comments Itching 07/05/2017        PROCEDURE:  Before the procedure, it was ensured that the patient was educated regarding the nature of her findings to date, the implications of them, and what was to be done. She has been made aware of the role of HPV, the natural history of infection, ways to minimize her future risk, the effect of HPV on the cervix, and treatment options available should  they be indicated. The   pathophysiology of the cervix, including a discussion of squamous vs. endometrial cells, and squamous metaplasia have all been reviewed, using illustrations and sketches. The details of the colposcopic procedure were reviewed, as well as the risks of missed diagnoses, pain, infection and bleeding. All questions were answered before proceeding, and informed consent was therefore obtained.    Bimanual examination: was not done  Unenhanced examination of the cervix was normal without lesions.  Pap smear and endocervical sampling not obtained due to:    not due    Pap repeated?:  No  SCJ seen?:  yes  Endocervical speculum needed?:  No  ECC done?:  Yes whitening in the cervical canal  Lugol's solution used?:  Yes   Satisfactory examination?:  yes    Vaginal vault: normal to cursory inspection   Urethra normal?:  yes  Labia normal?:  yes  Perineum normal?:  yes  Rectum normal?:  yes    FINDINGS:     Cervix: acetowhitening noted at 4:00  Procedure: biopsies taken (not including ECC): 1.    Procedure summary: Patient tolerated procedure well     Assessment: HPV related changes      Plan: Specimens labelled and sent to pathology., Will base further treatment on pathology findings., treatment options discussed with patient, post biopsy instructions given to patient and call/mychart to discuss Pathology results

## 2019-10-25 ENCOUNTER — MYC MEDICAL ADVICE (OUTPATIENT)
Dept: FAMILY MEDICINE | Facility: CLINIC | Age: 39
End: 2019-10-25

## 2019-10-25 NOTE — PROGRESS NOTES
"Subjective     Patience Gay is a 38 year old female who presents to clinic today for the following health issues:    HPI   Colposcopy   Also discussed recent pelvic ultrasound copied below.  Patient is not having menses due to ablation but not necessarily menopausal.  No hot flashes.  Found to have ovarian cysts bilaterally.      EXAMINATION: US PELVIC COMPLETE WITH TRANSVAGINAL, 9/24/2019 2:32 PM   IMPRESSION  1.  Bilateral ovarian cysts, right measuring 5.5 cm and left measuring  2.8 cm. In this postmenopausal woman GYN consultation and annual  ultrasound follow-up is recommended.       BP Readings from Last 3 Encounters:   10/24/19 109/65   09/10/19 (!) 84/58   08/26/19 106/67    Wt Readings from Last 3 Encounters:   10/24/19 53 kg (116 lb 14.4 oz)   09/10/19 52.1 kg (114 lb 12.8 oz)   08/26/19 50.2 kg (110 lb 11.2 oz)                      Reviewed and updated as needed this visit by Provider  Tobacco  Allergies  Meds  Med Hx  Surg Hx  Fam Hx  Soc Hx        Review of Systems   ROS COMP: Constitutional, HEENT, cardiovascular, pulmonary, gi and gu systems are negative, except as otherwise noted.      Objective    /65 (BP Location: Left arm, Patient Position: Chair, Cuff Size: Adult Regular)   Pulse 55   Temp 99.2  F (37.3  C) (Oral)   Resp 20   Ht 1.689 m (5' 6.5\")   Wt 53 kg (116 lb 14.4 oz)   LMP  (Exact Date)   SpO2 96%   Breastfeeding? No   BMI 18.59 kg/m    Body mass index is 18.59 kg/m .  Physical Exam   GENERAL: healthy, alert and no distress  NECK: no adenopathy, no asymmetry, masses, or scars and thyroid normal to palpation  RESP: lungs clear to auscultation - no rales, rhonchi or wheezes  CV: regular rate and rhythm, normal S1 S2, no S3 or S4, no murmur, click or rub, no peripheral edema and peripheral pulses strong  ABDOMEN: soft, nontender, no hepatosplenomegaly, no masses and bowel sounds normal  MS: no gross musculoskeletal defects noted, no edema    Diagnostic Test " Results:  Labs reviewed in Epic  Results for orders placed or performed in visit on 10/24/19   HCG Qual, Urine ([id number])   Result Value Ref Range    HCG Qual Urine Negative NEG^Negative   Follicle stimulating hormone   Result Value Ref Range    FSH 5.5 IU/L           Assessment & Plan     1. Cervical high risk HPV (human papillomavirus) test positive  See procedure note.  - HCG Qual, Urine ([id number])  - Surgical pathology exam  - COLP CERVIX/UPPER VAGINA W BX CERVIX/ENDOCERV CURETT    2. Amenorrhea  Not menopausal.  Management of the ovarian cysts recommended like premenopausal woman.   - Follicle stimulating hormone    3. Cysts of both ovaries  likely follicular cysts - follow up in 6 weeks for changes/resolution.  - US Pelvic Complete w Transvaginal; Future     Tobacco Cessation:   reports that she has been smoking. She has been smoking about 0.00 packs per day. She has never used smokeless tobacco.  Tobacco Cessation Action Plan: Information offered: Patient not interested at this time        Patient Instructions   Nothing in vagina for 1 week.    Results will be sent when available of biopsies.  Lab testing today to determine hormone levels.  I will make recommendations for the GYN evaluation or Ultrasound based on these results.      Return in about 1 year (around 10/24/2020) for PAP depending on results..    Kimberly Perez MD  [facility]

## 2019-10-29 LAB — COPATH REPORT: NORMAL

## 2019-10-31 NOTE — RESULT ENCOUNTER NOTE
Your biopsy results do not show any abnormalities.  I would recommend follow up PAP in 1 year.  Please call or MyChart message me if you have any questions.    ADELEK

## 2019-11-01 NOTE — TELEPHONE ENCOUNTER
This writer attempted to contact Patience on 11/01/19      Reason for call results and left message for patient to return call or read [employer] message regarding results.      If patient calls back:   [facility] Care Team (MA/TC) called. Inform patient that someone from the team will contact them, document that pt called and route to care team.         Rachel Kauffman

## 2019-11-08 ENCOUNTER — HEALTH MAINTENANCE LETTER (OUTPATIENT)
Age: 39
End: 2019-11-08

## 2019-12-03 ENCOUNTER — MYC MEDICAL ADVICE (OUTPATIENT)
Dept: FAMILY MEDICINE | Facility: CLINIC | Age: 39
End: 2019-12-03

## 2019-12-03 NOTE — TELEPHONE ENCOUNTER
My Chart message replied to in this encounter and separate encounter My Chart encounter from 12/3/19.    Rachael Alcantar RN

## 2020-02-11 ENCOUNTER — E-VISIT (OUTPATIENT)
Dept: FAMILY MEDICINE | Facility: CLINIC | Age: 40
End: 2020-02-11
Payer: COMMERCIAL

## 2020-02-11 DIAGNOSIS — B96.89 BV (BACTERIAL VAGINOSIS): Primary | ICD-10-CM

## 2020-02-11 DIAGNOSIS — N76.0 BV (BACTERIAL VAGINOSIS): Primary | ICD-10-CM

## 2020-02-11 DIAGNOSIS — N89.8 VAGINAL ODOR: ICD-10-CM

## 2020-02-11 PROCEDURE — 99421 OL DIG E/M SVC 5-10 MIN: CPT | Performed by: FAMILY MEDICINE

## 2020-02-11 RX ORDER — METRONIDAZOLE 500 MG/1
500 TABLET ORAL 2 TIMES DAILY
Qty: 14 TABLET | Refills: 0 | Status: SHIPPED | OUTPATIENT
Start: 2020-02-11 | End: 2020-02-20 | Stop reason: ALTCHOICE

## 2020-02-20 ENCOUNTER — MYC MEDICAL ADVICE (OUTPATIENT)
Dept: FAMILY MEDICINE | Facility: CLINIC | Age: 40
End: 2020-02-20

## 2020-02-20 DIAGNOSIS — B37.31 YEAST VAGINITIS: Primary | ICD-10-CM

## 2020-02-20 DIAGNOSIS — B96.89 BV (BACTERIAL VAGINOSIS): Primary | ICD-10-CM

## 2020-02-20 DIAGNOSIS — N76.0 BV (BACTERIAL VAGINOSIS): Primary | ICD-10-CM

## 2020-02-20 RX ORDER — METRONIDAZOLE 7.5 MG/G
1 GEL VAGINAL DAILY
Qty: 25 G | Refills: 0 | Status: SHIPPED | OUTPATIENT
Start: 2020-02-20 | End: 2020-02-25

## 2020-02-20 RX ORDER — FLUCONAZOLE 150 MG/1
150 TABLET ORAL ONCE
Qty: 1 TABLET | Refills: 0 | Status: SHIPPED | OUTPATIENT
Start: 2020-02-20 | End: 2020-02-20

## 2020-10-07 ENCOUNTER — PATIENT OUTREACH (OUTPATIENT)
Dept: FAMILY MEDICINE | Facility: CLINIC | Age: 40
End: 2020-10-07

## 2020-10-07 DIAGNOSIS — R87.810 CERVICAL HIGH RISK HPV (HUMAN PAPILLOMAVIRUS) TEST POSITIVE: ICD-10-CM

## 2020-11-04 ENCOUNTER — PATIENT OUTREACH (OUTPATIENT)
Dept: FAMILY MEDICINE | Facility: CLINIC | Age: 40
End: 2020-11-04

## 2020-11-04 DIAGNOSIS — R87.810 CERVICAL HIGH RISK HPV (HUMAN PAPILLOMAVIRUS) TEST POSITIVE: ICD-10-CM

## 2020-11-04 NOTE — TELEPHONE ENCOUNTER
Spoke with pt, states she will call to schedule.  Elena Cook -   [facility] Pap Tracking     3

## 2020-12-06 ENCOUNTER — HEALTH MAINTENANCE LETTER (OUTPATIENT)
Age: 40
End: 2020-12-06

## 2021-09-25 ENCOUNTER — HEALTH MAINTENANCE LETTER (OUTPATIENT)
Age: 41
End: 2021-09-25

## 2022-01-15 ENCOUNTER — HEALTH MAINTENANCE LETTER (OUTPATIENT)
Age: 42
End: 2022-01-15

## 2023-01-07 ENCOUNTER — HEALTH MAINTENANCE LETTER (OUTPATIENT)
Age: 43
End: 2023-01-07

## 2023-04-22 ENCOUNTER — HEALTH MAINTENANCE LETTER (OUTPATIENT)
Age: 43
End: 2023-04-22

## 2023-06-08 ENCOUNTER — TELEPHONE (OUTPATIENT)
Dept: UROLOGY | Facility: CLINIC | Age: 43
End: 2023-06-08

## 2023-06-08 NOTE — TELEPHONE ENCOUNTER
Called patient to request coming into clinic 10-15 minutes prior to appointment time on 6/13 to fill out new patient questionnaire and give a urine sample. Patient's phone possibly disconnected.     Radhika Berrios MA

## 2024-02-10 ENCOUNTER — HEALTH MAINTENANCE LETTER (OUTPATIENT)
Age: 44
End: 2024-02-10